# Patient Record
Sex: FEMALE
[De-identification: names, ages, dates, MRNs, and addresses within clinical notes are randomized per-mention and may not be internally consistent; named-entity substitution may affect disease eponyms.]

---

## 2019-08-13 VITALS — SYSTOLIC BLOOD PRESSURE: 155 MMHG | DIASTOLIC BLOOD PRESSURE: 81 MMHG

## 2019-08-13 LAB
APTT PPP: 30.7 SEC (ref 26.3–35.5)
BASOPHILS NFR BLD AUTO: 1 % (ref 0–5)
BUN SERPL-MCNC: 18 MG/DL (ref 7–18)
CHLORIDE SERPL-SCNC: 103 MMOL/L (ref 101–111)
CO2 SERPL-SCNC: 30 MMOL/L (ref 21–32)
CREAT SERPL-MCNC: 0.8 MG/DL (ref 0.5–1.5)
EOSINOPHIL NFR BLD AUTO: 3.3 % (ref 0–8)
ERYTHROCYTE [DISTWIDTH] IN BLOOD BY AUTOMATED COUNT: 13.3 % (ref 11–15.5)
GFR SERPL CREATININE-BSD FRML MDRD: 77 ML/MIN (ref 60–?)
GLUCOSE SERPL-MCNC: 194 MG/DL (ref 70–105)
HCT VFR BLD AUTO: 38.5 % (ref 36–48)
INR PPP: 0.91 (ref 0.85–1.15)
LYMPHOCYTES NFR SPEC AUTO: 24.9 % (ref 21–51)
MCH RBC QN AUTO: 28.5 PG (ref 27–33)
MCHC RBC AUTO-ENTMCNC: 33.4 G/DL (ref 32–36)
MCV RBC AUTO: 85.1 FL (ref 79–99)
MONOCYTES NFR BLD AUTO: 6.2 % (ref 3–13)
NEUTROPHILS NFR BLD AUTO: 64.6 % (ref 40–77)
NRBC BLD MANUAL-RTO: 0 % (ref 0–0.19)
PH UR STRIP: 5 [PH] (ref 5–8)
PLATELET # BLD AUTO: 227 K/UL (ref 130–400)
POTASSIUM SERPL-SCNC: 4.5 MMOL/L (ref 3.5–5.1)
PROTHROMBIN TIME: 9.6 SEC (ref 9.6–11.6)
RBC # BLD AUTO: 4.52 MIL/UL (ref 4–5.5)
RBC #/AREA URNS HPF: (no result) /HPF (ref 0–1)
SODIUM SERPL-SCNC: 138 MMOL/L (ref 136–145)
SP GR UR STRIP: 1.02 (ref 1–1.03)
UROBILINOGEN UR STRIP-MCNC: 0.2 MG/DL (ref 0.2–1)
WBC # BLD AUTO: 6 K/UL (ref 4.8–10.8)
WBC #/AREA URNS HPF: (no result) /HPF (ref 0–1)

## 2019-08-15 ENCOUNTER — HOSPITAL ENCOUNTER (OUTPATIENT)
Dept: HOSPITAL 90 - DAH | Age: 64
Discharge: HOME | End: 2019-08-15
Attending: INTERNAL MEDICINE
Payer: MEDICAID

## 2019-08-15 VITALS — DIASTOLIC BLOOD PRESSURE: 66 MMHG | SYSTOLIC BLOOD PRESSURE: 130 MMHG

## 2019-08-15 VITALS — SYSTOLIC BLOOD PRESSURE: 135 MMHG | DIASTOLIC BLOOD PRESSURE: 73 MMHG

## 2019-08-15 VITALS — DIASTOLIC BLOOD PRESSURE: 88 MMHG | SYSTOLIC BLOOD PRESSURE: 153 MMHG

## 2019-08-15 VITALS — DIASTOLIC BLOOD PRESSURE: 65 MMHG | SYSTOLIC BLOOD PRESSURE: 126 MMHG

## 2019-08-15 VITALS — DIASTOLIC BLOOD PRESSURE: 61 MMHG | SYSTOLIC BLOOD PRESSURE: 128 MMHG

## 2019-08-15 VITALS — DIASTOLIC BLOOD PRESSURE: 69 MMHG | SYSTOLIC BLOOD PRESSURE: 136 MMHG

## 2019-08-15 VITALS — SYSTOLIC BLOOD PRESSURE: 154 MMHG | DIASTOLIC BLOOD PRESSURE: 80 MMHG

## 2019-08-15 VITALS — SYSTOLIC BLOOD PRESSURE: 133 MMHG | DIASTOLIC BLOOD PRESSURE: 73 MMHG

## 2019-08-15 VITALS — SYSTOLIC BLOOD PRESSURE: 150 MMHG | DIASTOLIC BLOOD PRESSURE: 62 MMHG

## 2019-08-15 VITALS — DIASTOLIC BLOOD PRESSURE: 63 MMHG | SYSTOLIC BLOOD PRESSURE: 160 MMHG

## 2019-08-15 VITALS — DIASTOLIC BLOOD PRESSURE: 73 MMHG | SYSTOLIC BLOOD PRESSURE: 139 MMHG

## 2019-08-15 VITALS — SYSTOLIC BLOOD PRESSURE: 158 MMHG | DIASTOLIC BLOOD PRESSURE: 81 MMHG

## 2019-08-15 VITALS — DIASTOLIC BLOOD PRESSURE: 70 MMHG | SYSTOLIC BLOOD PRESSURE: 131 MMHG

## 2019-08-15 VITALS — BODY MASS INDEX: 44.18 KG/M2 | HEIGHT: 61 IN | WEIGHT: 234 LBS

## 2019-08-15 VITALS — SYSTOLIC BLOOD PRESSURE: 150 MMHG | DIASTOLIC BLOOD PRESSURE: 67 MMHG

## 2019-08-15 VITALS — SYSTOLIC BLOOD PRESSURE: 145 MMHG | DIASTOLIC BLOOD PRESSURE: 74 MMHG

## 2019-08-15 VITALS — SYSTOLIC BLOOD PRESSURE: 153 MMHG | DIASTOLIC BLOOD PRESSURE: 70 MMHG

## 2019-08-15 DIAGNOSIS — Z79.84: ICD-10-CM

## 2019-08-15 DIAGNOSIS — Z79.899: ICD-10-CM

## 2019-08-15 DIAGNOSIS — Z88.8: ICD-10-CM

## 2019-08-15 DIAGNOSIS — E11.9: ICD-10-CM

## 2019-08-15 DIAGNOSIS — Z90.710: ICD-10-CM

## 2019-08-15 DIAGNOSIS — Z98.890: ICD-10-CM

## 2019-08-15 DIAGNOSIS — Z82.3: ICD-10-CM

## 2019-08-15 DIAGNOSIS — I35.0: ICD-10-CM

## 2019-08-15 DIAGNOSIS — E66.9: ICD-10-CM

## 2019-08-15 DIAGNOSIS — I25.10: Primary | ICD-10-CM

## 2019-08-15 DIAGNOSIS — Z83.3: ICD-10-CM

## 2019-08-15 DIAGNOSIS — Z90.49: ICD-10-CM

## 2019-08-15 DIAGNOSIS — E78.5: ICD-10-CM

## 2019-08-15 DIAGNOSIS — I10: ICD-10-CM

## 2019-08-15 PROCEDURE — 71045 X-RAY EXAM CHEST 1 VIEW: CPT

## 2019-08-15 PROCEDURE — 85610 PROTHROMBIN TIME: CPT

## 2019-08-15 PROCEDURE — 82948 REAGENT STRIP/BLOOD GLUCOSE: CPT

## 2019-08-15 PROCEDURE — 85025 COMPLETE CBC W/AUTO DIFF WBC: CPT

## 2019-08-15 PROCEDURE — 93005 ELECTROCARDIOGRAM TRACING: CPT

## 2019-08-15 PROCEDURE — 99156 MOD SED OTH PHYS/QHP 5/>YRS: CPT

## 2019-08-15 PROCEDURE — 80048 BASIC METABOLIC PNL TOTAL CA: CPT

## 2019-08-15 PROCEDURE — 99157 MOD SED OTHER PHYS/QHP EA: CPT

## 2019-08-15 PROCEDURE — 93458 L HRT ARTERY/VENTRICLE ANGIO: CPT

## 2019-08-15 PROCEDURE — 36415 COLL VENOUS BLD VENIPUNCTURE: CPT

## 2019-08-15 PROCEDURE — 81001 URINALYSIS AUTO W/SCOPE: CPT

## 2019-08-15 PROCEDURE — 85730 THROMBOPLASTIN TIME PARTIAL: CPT

## 2019-08-15 NOTE — NUR
PATIENT ARRIVED 



PATIENT ARRIVED TO DAY PATIENT ACCOMPANIED BY DAUGHTER, TOM SLAUGHTER. PATIENT AAOX3, 
RESPIRATIONS UNLABORED, PATIENT DENIES PAIN AT THIS TIME. VITAL SIGNS STABLE. PROCEDURE 
VERIFIED AND CONFIRMED WITH PATIENT. PROCEDURE EXPLAINED TO PATIENT AND PATIENT VERBALIZED 
UNDERSTANDING.

## 2019-08-15 NOTE — NUR
PATIENT RETURNED



PATIENT BROUGHT BACK FROM CATH LAB BY SANDIP MULLEN RN. PATIENT AAOX3, RESPIRATIONS UNLABORED, 
VITAL SIGNS STABLE, PATIENT DENIES ANY PAIN AT THIS TIME. FAMILY MEMBERS AT BEDSIDE. RIGHT 
FEMORAL SITE WITH 6FR SHEATH IN PLACE TO RIGHT GROIN, SIGHT SOFT, NO OOZING.

## 2019-08-15 NOTE — NUR
PATIENT TRANSFERRED 



PATIENT TAKEN TO CATH LAB VIA BED BY DILLON KHAN. PATIENT'S FAMILY INSTRUCTED TO WAIT IN 
ROOM FOR PATIENT SO THAT THEY MAY SPEAK WITH `DOCTOR AFTER PROCEDURE IS COMPLETE.

## 2019-08-15 NOTE — NUR
DISCHARGED



DISCHARGE INSTRUCTIONS PROVIDED TO PATIENT AND PATIENT'S DAUGHTER (TOM SLAUGHTER). FOLLOW UP 
APPOINTMENTS PROVIDED. FEMORAL SITE CARE EXPLAINED, ALL QUESTIONS/CONCERNS ADDRESSED. 
PATIENT AND DAUGHTER VERBALIZED UNDERSTANDING. 

PATIENT TAKEN TO PRIVATE VEHICLE VIA WHEELCHAIR AND ACCOMPANIED BY DAUGHTERS.

## 2020-02-10 ENCOUNTER — HOSPITAL ENCOUNTER (OUTPATIENT)
Dept: HOSPITAL 90 - DAH | Age: 65
Discharge: HOME | End: 2020-02-10
Attending: INTERNAL MEDICINE
Payer: MEDICARE

## 2020-02-10 VITALS — SYSTOLIC BLOOD PRESSURE: 135 MMHG | DIASTOLIC BLOOD PRESSURE: 80 MMHG

## 2020-02-10 VITALS — DIASTOLIC BLOOD PRESSURE: 75 MMHG | SYSTOLIC BLOOD PRESSURE: 115 MMHG

## 2020-02-10 VITALS — SYSTOLIC BLOOD PRESSURE: 159 MMHG | DIASTOLIC BLOOD PRESSURE: 79 MMHG

## 2020-02-10 VITALS — SYSTOLIC BLOOD PRESSURE: 141 MMHG | DIASTOLIC BLOOD PRESSURE: 84 MMHG

## 2020-02-10 VITALS — BODY MASS INDEX: 44.56 KG/M2 | HEIGHT: 61 IN | WEIGHT: 236 LBS

## 2020-02-10 DIAGNOSIS — Z90.710: ICD-10-CM

## 2020-02-10 DIAGNOSIS — R14.0: ICD-10-CM

## 2020-02-10 DIAGNOSIS — I25.10: ICD-10-CM

## 2020-02-10 DIAGNOSIS — Z98.890: ICD-10-CM

## 2020-02-10 DIAGNOSIS — I10: ICD-10-CM

## 2020-02-10 DIAGNOSIS — E78.5: ICD-10-CM

## 2020-02-10 DIAGNOSIS — K21.0: ICD-10-CM

## 2020-02-10 DIAGNOSIS — Z90.49: ICD-10-CM

## 2020-02-10 DIAGNOSIS — K29.70: ICD-10-CM

## 2020-02-10 DIAGNOSIS — E11.9: ICD-10-CM

## 2020-02-10 DIAGNOSIS — K59.04: Primary | ICD-10-CM

## 2020-02-10 DIAGNOSIS — M19.90: ICD-10-CM

## 2020-02-10 DIAGNOSIS — I25.2: ICD-10-CM

## 2020-02-10 PROCEDURE — 82948 REAGENT STRIP/BLOOD GLUCOSE: CPT

## 2020-02-10 PROCEDURE — 43239 EGD BIOPSY SINGLE/MULTIPLE: CPT

## 2022-02-28 ENCOUNTER — HOSPITAL ENCOUNTER (OUTPATIENT)
Dept: HOSPITAL 90 - SHCH | Age: 67
Discharge: HOME | End: 2022-02-28
Attending: INTERNAL MEDICINE
Payer: MEDICARE

## 2022-02-28 VITALS — HEIGHT: 61 IN | WEIGHT: 234 LBS | BODY MASS INDEX: 44.18 KG/M2

## 2022-02-28 DIAGNOSIS — R07.89: ICD-10-CM

## 2022-02-28 DIAGNOSIS — I25.118: Primary | ICD-10-CM

## 2022-02-28 DIAGNOSIS — R94.39: ICD-10-CM

## 2022-02-28 DIAGNOSIS — R06.02: ICD-10-CM

## 2022-02-28 PROCEDURE — 96374 THER/PROPH/DIAG INJ IV PUSH: CPT

## 2022-02-28 PROCEDURE — 78452 HT MUSCLE IMAGE SPECT MULT: CPT

## 2022-02-28 PROCEDURE — 93017 CV STRESS TEST TRACING ONLY: CPT

## 2024-10-28 ENCOUNTER — HOSPITAL ENCOUNTER (OUTPATIENT)
Dept: HOSPITAL 90 - RAH | Age: 69
Discharge: HOME | End: 2024-10-28
Attending: STUDENT IN AN ORGANIZED HEALTH CARE EDUCATION/TRAINING PROGRAM
Payer: MEDICARE

## 2024-10-28 DIAGNOSIS — R10.9: ICD-10-CM

## 2024-10-28 DIAGNOSIS — K43.9: Primary | ICD-10-CM

## 2024-10-28 DIAGNOSIS — Z90.49: ICD-10-CM

## 2024-10-28 PROCEDURE — 74176 CT ABD & PELVIS W/O CONTRAST: CPT
